# Patient Record
Sex: MALE | Race: ASIAN | NOT HISPANIC OR LATINO | URBAN - METROPOLITAN AREA
[De-identification: names, ages, dates, MRNs, and addresses within clinical notes are randomized per-mention and may not be internally consistent; named-entity substitution may affect disease eponyms.]

---

## 2019-04-06 ENCOUNTER — EMERGENCY (EMERGENCY)
Facility: HOSPITAL | Age: 24
LOS: 1 days | Discharge: HOME | End: 2019-04-06
Attending: EMERGENCY MEDICINE | Admitting: EMERGENCY MEDICINE
Payer: MEDICAID

## 2019-04-06 VITALS
SYSTOLIC BLOOD PRESSURE: 120 MMHG | TEMPERATURE: 98 F | RESPIRATION RATE: 18 BRPM | WEIGHT: 160.06 LBS | HEIGHT: 67 IN | OXYGEN SATURATION: 100 % | HEART RATE: 91 BPM | DIASTOLIC BLOOD PRESSURE: 78 MMHG

## 2019-04-06 PROCEDURE — 12002 RPR S/N/AX/GEN/TRNK2.6-7.5CM: CPT

## 2019-04-06 PROCEDURE — 99283 EMERGENCY DEPT VISIT LOW MDM: CPT | Mod: 25

## 2019-04-06 PROCEDURE — 99282 EMERGENCY DEPT VISIT SF MDM: CPT | Mod: 25

## 2019-04-06 RX ORDER — ACETAMINOPHEN 500 MG
650 TABLET ORAL ONCE
Qty: 0 | Refills: 0 | Status: COMPLETED | OUTPATIENT
Start: 2019-04-06 | End: 2019-04-06

## 2019-04-06 RX ADMIN — Medication 650 MILLIGRAM(S): at 02:35

## 2019-04-06 RX ADMIN — Medication 650 MILLIGRAM(S): at 01:34

## 2019-04-06 NOTE — ED PROVIDER NOTE - CLINICAL SUMMARY MEDICAL DECISION MAKING FREE TEXT BOX
22 yo male with small superficial laceration to his right distal 2nd and 3rd digits. no suturing required. tetanus is UTD. wounds cleaned and steri-strips applied. wound care explained.

## 2019-04-06 NOTE — ED PROVIDER NOTE - MUSCULOSKELETAL, MLM
Spine appears normal, all extremities grossly normal, range of motion is not limited, no muscle or joint tenderness

## 2019-04-06 NOTE — ED ADULT TRIAGE NOTE - CHIEF COMPLAINT QUOTE
Pt states "I cut 3 of my fingers on a plastic drone about 40 mins ago." Pt noted with superficial lacerations to 3 middle fingers on rt hand. No bleeding noted. Pt unsure of last tetanus

## 2019-04-06 NOTE — ED PROVIDER NOTE - OBJECTIVE STATEMENT
24 yo male with multiple superficial laceration to his right 2nd and 3rd fingertips. No nail lacerations. Pt reports it was bleeding a lot and he wasn't sure if he needs stitches. Tetanus is UTD. Pt has NROM to injured digits and sensations intact.

## 2019-04-06 NOTE — ED PROVIDER NOTE - NSFOLLOWUPINSTRUCTIONS_ED_ALL_ED_FT
SKIN ADHESIVE CARE - AfterCare(R) Instructions(ER/ED)     Skin Adhesive Care    WHAT YOU NEED TO KNOW:    Skin adhesive is medical glue used to close wounds. It is a substitute for staples and stitches. Skin adhesive wound closures take less time and do not require anesthesia. You have less pain and a lower risk of infection than with staples or stitches. Skin adhesive will fall off after the wound is healed.     DISCHARGE INSTRUCTIONS:    Self-care:     Keep your wound clean and dry for 1 to 5 days. You can shower 24 hours after the skin adhesive is applied. Lightly pat your wound dry after you shower.      Do not soak your wound in water, such as in a bath or hot tub.      Do not scrub your wound or pick at the adhesive. This can make your wound reopen.       Do not apply ointments to your wound. These include antibiotic and other ointments that contain petroleum jelly. These products will remove skin adhesive and reopen your wound.     Follow up with your healthcare provider as directed: Write down your questions so you remember to ask them during your visits.     Contact your healthcare provider if:     You have a fever.       Your wound is red and warm to touch.       You have questions or concerns about your condition or care.     Return to the emergency department if:     Your wound has fluid draining from it.       Your wound opens.              Sterile Tape Wound Care  Some cuts and wounds can be closed using sterile tape, also called skin adhesive strips. Skin adhesive strips can be used for shallow (superficial) and simple cuts, wounds, lacerations, and some surgical incisions. These strips act in place of stitches, or in addition to stitches, to hold the edges of the wound together to allow for better healing. Unlike stitches, the adhesive strips do not require needles or anesthetic medicine for placement. The strips usually fall off on their own as the wound is healing. It is important to take proper care of your wound at home while it heals.    How to care for a sterile tape wound  Try to keep the area around your wound clean and dry. Do not allow the adhesive strips to get wet for the first 12 hours.  Do not use any soaps or ointments on the wound for the first 12 hours.  If a bandage (dressing) has been applied, keep it dry.  Follow instructions from your health care provider about how often to change the dressing.    Wash your hands with soap and water before you change your dressing. If soap and water are not available, use hand .  Change your dressing as told by your health care provider.  Leave adhesive strips in place. These skin closures may need to stay in place for 2 weeks or longer. If adhesive strip edges start to loosen and curl up, you may trim the loose edges. Do not remove adhesive strips completely unless your health care provider tells you to do that.    Do not scratch, rub, or pick at the wound area.  Protect the wound from further injury until it is healed.  Protect the wound from sun and tanning bed exposure while it is healing, and for several weeks after healing.  ImageCheck the wound every day for signs of infection. Check for:    More redness, swelling, or pain.  More fluid or blood.  Warmth.  Pus or a bad smell.    Follow these instructions at home:  Take over-the-counter and prescription medicines only as told by your health care provider.  Keep all follow-up visits as told by your health care provider. This is important.  Contact a health care provider if:  Your adhesive strips become soaked with blood or fall off before the wound has healed. The tape will need to be replaced.  You have a fever.  Get help right away if:  You have chills.  You develop a rash after the strips are applied.  You have a red streak that goes away from the wound.  You have more redness, swelling, or pain around your wound.  You have more fluid or blood coming from your wound.  Your wound feels warm to the touch.  You have pus or a bad smell coming from your wound.  Your wound breaks open.  This information is not intended to replace advice given to you by your health care provider. Make sure you discuss any questions you have with your health care provider.

## 2019-04-06 NOTE — ED ADULT NURSE NOTE - NSIMPLEMENTINTERV_GEN_ALL_ED
Implemented All Universal Safety Interventions:  [place] to call system. Call bell, personal items and telephone within reach. Instruct patient to call for assistance. Room bathroom lighting operational. Non-slip footwear when patient is off stretcher. Physically safe environment: no spills, clutter or unnecessary equipment. Stretcher in lowest position, wheels locked, appropriate side rails in place.

## 2019-04-06 NOTE — ED ADULT NURSE NOTE - OBJECTIVE STATEMENT
multiple superficial cuts on his left 3 middle fingers accidentally happened while at work multiple superficial cuts on his right 3 middle fingers accidentally happened while at work multiple superficial cuts on his right 3 middle fingers accidentally happened while at work.

## 2019-04-06 NOTE — ED PROVIDER NOTE - SKIN, MLM
Skin normal color for race, warm, dry and intact. No evidence of rash.  few small very superficial linear lacerations to right distal 2nd and 3rd digits. No fingernail lacerations, no active bleeding, no suturing required.
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
## 2019-04-10 DIAGNOSIS — S61.210A LACERATION WITHOUT FOREIGN BODY OF RIGHT INDEX FINGER WITHOUT DAMAGE TO NAIL, INITIAL ENCOUNTER: ICD-10-CM
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
## 2019-04-10 DIAGNOSIS — S61.212A LACERATION WITHOUT FOREIGN BODY OF RIGHT MIDDLE FINGER WITHOUT DAMAGE TO NAIL, INITIAL ENCOUNTER: ICD-10-CM
